# Patient Record
Sex: MALE | Race: WHITE
[De-identification: names, ages, dates, MRNs, and addresses within clinical notes are randomized per-mention and may not be internally consistent; named-entity substitution may affect disease eponyms.]

---

## 2018-02-15 ENCOUNTER — HOSPITAL ENCOUNTER (EMERGENCY)
Dept: HOSPITAL 62 - ER | Age: 33
Discharge: LEFT BEFORE DISCHARGE | End: 2018-02-15
Payer: SELF-PAY

## 2018-02-15 VITALS — DIASTOLIC BLOOD PRESSURE: 78 MMHG | SYSTOLIC BLOOD PRESSURE: 114 MMHG

## 2018-02-15 DIAGNOSIS — Z53.21: Primary | ICD-10-CM

## 2018-03-18 ENCOUNTER — HOSPITAL ENCOUNTER (EMERGENCY)
Dept: HOSPITAL 62 - ER | Age: 33
Discharge: HOME | End: 2018-03-18
Payer: SELF-PAY

## 2018-03-18 VITALS — DIASTOLIC BLOOD PRESSURE: 69 MMHG | SYSTOLIC BLOOD PRESSURE: 111 MMHG

## 2018-03-18 DIAGNOSIS — K02.9: ICD-10-CM

## 2018-03-18 DIAGNOSIS — K08.89: ICD-10-CM

## 2018-03-18 DIAGNOSIS — R22.0: ICD-10-CM

## 2018-03-18 DIAGNOSIS — F17.200: ICD-10-CM

## 2018-03-18 DIAGNOSIS — K04.7: Primary | ICD-10-CM

## 2018-03-18 PROCEDURE — 99282 EMERGENCY DEPT VISIT SF MDM: CPT

## 2018-03-18 NOTE — ER DOCUMENT REPORT
ED General





- General


Chief Complaint: Toothache


Stated Complaint: TOOTH PAIN


Time Seen by Provider: 03/18/18 03:19


Notes: 





Patient is a 32-year-old male without past medical history presents with 2 days 

of progressively worsening pain and swelling to the left lower jaw.  He 

describes it as severe, constant, throbbing pain.  Worsened by drinking and 

eating.  He has tried over-the-counter pain medications without any relief.  

States this feels similar to when he had dental infections in the past.  He has 

not seen a dentist or make contact with the dentist regarding today's concerns 

due to lack of availability to pay for these services.  He denies any fever or 

constitutional symptoms.  No headache or neck pain.  No difficulty breathing or 

swallowing.


TRAVEL OUTSIDE OF THE [place] IN LAST 30 DAYS: No





- Related Data


Allergies/Adverse Reactions: 


 





No Known Allergies Allergy (Verified 12/24/17 08:52)


 











Past Medical History





- General


Information source: Patient





- Social History


Smoking Status: Current Every Day Smoker


Frequency of alcohol use: Rare


Drug Abuse: None


Lives with: Family


Family History: Reviewed & Not Pertinent


Renal/ Medical History: Denies: Hx Peritoneal Dialysis





Review of Systems





- Review of Systems


Notes: 





Constitutional: Negative for fever.


HENT: Positive for dental pain and lower facial swelling


Eyes: Negative for visual changes.


Cardiovascular: Negative for chest pain.


Respiratory: Negative for shortness of breath.


Gastrointestinal: Negative for abdominal pain, vomiting or diarrhea.


Genitourinary: Negative for dysuria.


Musculoskeletal: Negative for back pain.


Skin: Negative for rash.


Neurological: Negative for headaches, weakness or numbness.





10 point ROS negative except as marked above and in HPI.





Physical Exam





- Vital signs


Interpretation: Normal


Notes: 





PHYSICAL EXAMINATION:





GENERAL: Well-appearing, well-nourished and in no acute distress.





HEAD: Atraumatic, normocephalic.





EYES: Pupils equal round and reactive to light, extraocular movements intact, 

sclera anicteric, conjunctiva are normal.





ENT: Mild swelling to the left lower mandible, poor dentition throughout, tooth 

#18 is cracked with obvious deep dental carry without any evidence of an apical 

abscess or fluctuance to the gumline





NECK: Normal range of motion, supple without lymphadenopathy





LUNGS: Breath sounds clear to auscultation bilaterally and equal.  No wheezes 

rales or rhonchi.





HEART: Regular rate and rhythm without murmurs





ABDOMEN: Soft, nontender, normoactive bowel sounds.  No guarding, no rebound.  

No masses appreciated.





EXTREMITIES: Normal range of motion, no pitting or edema.  No cyanosis.





NEUROLOGICAL: No focal neurological deficits. Moves all extremities 

spontaneously and on command.





PSYCH: Normal mood, normal affect.





SKIN: Warm, Dry, normal turgor, no rashes or lesions noted.





Course





- Re-evaluation


Re-evalutation: 





03/18/18 03:31


Presentation is most consistent with likely an infected tooth.  Patient does 

have moderate facial swelling however airway is patent.  Vitals within normal 

limits.  Patient is able swallow without any difficulty.   No evidence of 

Emiliano angina, apical abscess, or airway obstruction.  Patient will be started 

on antibiotics.  I've instructed to follow-up with dentistry as earliest 

ability for definitive management.  At this time will discharge with return 

precautions and follow-up recommendations.  Verbal discharge instructions given 

a the bedside and opportunity for questions given. Medication warnings 

reviewed. Patient is in agreement with this plan and has verbalized 

understanding of return precautions and the need for primary care follow-up in 

the next 24-72 hours.





Discharge





- Discharge


Clinical Impression: 


 Dental infection, Facial swelling, Pain, dental





Condition: Good


Disposition: HOME, SELF-CARE


Additional Instructions: 


You have been seen for dental pain.  You have an infected tooth that needs to 

be removed urgently.  Your being started on antibiotics to try to treat the 

infection in the interim but this is not definitive management.  It is very 

important that you follow-up with a dentist for definitive care.  Please return 

if you develop fever greater than 101, swelling in your face, vomiting, 

difficulty breathing or swallowing, or any other symptoms that are concerning 

to you.  For your pain: Take ibuprofen 600 mg and acetaminophen 1000 mg every 6 

hours together as needed for pain.  If this does not control your pain you may 

take 15 mg of oral morphine every 4 hours as needed.  Please be very careful 

about using the oral morphine and only use this for severe pain.


Prescriptions: 


Morphine Sulfate [Morphine Ir 15 mg Tablet] 15 mg PO Q4HP PRN #12 tablet


 PRN Reason: 


Amox Tr/Potassium Clavulanate [Augmentin 875-125 Tablet] 1 tab PO BID 10 Days  

tablet

## 2018-03-21 ENCOUNTER — HOSPITAL ENCOUNTER (EMERGENCY)
Dept: HOSPITAL 62 - ER | Age: 33
Discharge: LEFT BEFORE DISCHARGE | End: 2018-03-21
Payer: SELF-PAY

## 2018-03-21 VITALS — SYSTOLIC BLOOD PRESSURE: 122 MMHG | DIASTOLIC BLOOD PRESSURE: 70 MMHG

## 2018-03-21 DIAGNOSIS — K05.10: ICD-10-CM

## 2018-03-21 DIAGNOSIS — F17.200: ICD-10-CM

## 2018-03-21 DIAGNOSIS — K08.89: ICD-10-CM

## 2018-03-21 DIAGNOSIS — K02.9: Primary | ICD-10-CM

## 2018-03-21 PROCEDURE — 99282 EMERGENCY DEPT VISIT SF MDM: CPT

## 2018-03-21 NOTE — ER DOCUMENT REPORT
HPI





- HPI


Pain Level: 5


Notes: 





Patient is a 32-year-old male with no significant past medical history who 

presents to the ED complaining of continued dental pain to approximately #184-

5 days.  Patient states that he was evaluated 3 days ago and was given morphine 

sulfate as well as Augmentin.  Patient states that he has been taking his 

medications as directed, but has been taking most of his morphine and is down 

to 1 tablet because of pain.  Patient states that his overall swelling has 

resolved, but continues to have pain and is only here because he wants more and 

stronger narcotics.  Patient states that the morphine does not work as well as 

he thinks it should any needs a stronger narcotic.  Patient has not called to 

schedule an appoint with a dentist at this time.  He has not noticed any 

obvious abscess or purulent discharge.  He is eating and drinking without any 

difficulties.  He is urinating normally and having normal bowel movements.  

Patient states that he has not been taking any Tylenol or Motrin because that 

does not work.  denies any drug allergies.  Patient admits to smoking but 

denies IV drug use.  Denies any headache, fever, head injury, neck pain, URI, 

sore throat, chest pain, palpitations, syncope, cough, shortness of breath, 

wheeze, dyspnea, abdominal pain, nausea/vomiting/diarrhea, urinary retention, 

dysuria, hematuria, or rash.





- ROS


Systems Reviewed and Negative: Yes All other systems reviewed and negative





Past Medical History





- Social History


Smoking Status: Current Every Day Smoker


Family History: Reviewed & Not Pertinent


Renal/ Medical History: Denies: Hx Peritoneal Dialysis





Vertical Provider Document





- CONSTITUTIONAL


Agree With Documented VS: Yes


Notes: 





PHYSICAL EXAMINATION:





GENERAL: Well-appearing, well-nourished and in no acute distress.





HEAD: Atraumatic, normocephalic.





EYES: Pupils equal round and reactive to light, extraocular movements intact, 

sclera anicteric, conjunctiva are normal.





ENT: EAC clear b/l.  TM's intact b/l without erythema, fluid, or perforation.  

Nares patent and without discharge.  oropharynx clear without exudates.  No 

tonsilar hypertrophy or erythema.  Moist mucous membranes.  No sinus 

tenderness.  Uvula midline.  No palatine shift.  No tongue protrusion.  No 

respiratory compromise.





Mouth: Pt has upper dental plate and partial lower plate.  Poor dentition.  + 

moderate/severe decay and mild gingivitis.  No obvious abscess or discharge 

noted.  No facial swelling.  + tenderness to tooth #18.





NECK: Normal range of motion, supple without lymphadenopathy.  No rigidity/

meningismus.





LUNGS: Breath sounds clear to auscultation bilaterally and equal.  No wheezes 

rales or rhonchi.





HEART: Regular rate and rhythm without murmurs, rubs, gallops.





NEUROLOGICAL: Cranial nerves grossly intact.  Normal speech, normal gait.  

Normal sensory, motor exams 





PSYCH: Normal mood, normal affect.





SKIN: Warm, Dry, normal turgor, no rashes or lesions noted.





- INFECTION CONTROL


TRAVEL OUTSIDE OF THE [place] IN LAST 30 DAYS: No





Course





- Re-evaluation


Re-evalutation: 





03/21/18 17:15


Patient is an afebrile, well-hydrated, 32-year-old male who presents to the ED 

with continued dental pain requesting narcotic medication as he is about to run 

out of his morphine that he was given 3 days ago.  Vitals are acceptable.  PE 

is otherwise unremarkable.  Patient states that the only reason he is here is 

for more pain medications.  Patient has not called a dentist to schedule an 

appointment.  Patient has no swelling or evidence of abscess on exam today.  

Patient states that he has not been taking any Tylenol or Motrin because that 

does not help take away pain.  Patient is started on Augmentin.  Toradol 

ordered.  I will send him with viscous lidocaine as well.  Declined dental 

block.  Advised patient that I will not be sending him home with any narcotic 

medication at this time and that he needs to be seen by dentist for probable 

extraction of that tooth and no I&D warranted.  Advised that he take Tylenol 

and Motrin as well as other conservative measures.  Low suspicion for any 

meningitis, sepsis, peritonsillar/pharyngeal abscess, respiratory compromise, 

Emiliano's, temporal arteritis, or other emergent systemic condition at this 

time.  Patient is aware this condition can change from initial presentation and 

he needs to monitor symptoms closely.  Conservative measures otherwise for 

symptoms.  Call to schedule an appointment with a dentist for further 

evaluation and management.  Return to the ED with any worsening/concerning 

symptoms otherwise as reviewed in discharge.  Patient is in agreement.








I reviewed all of this with the patient prior to him walking out.


Upon leaving the room and the nurse pulling the medications, patient left 

through the back door (pt was already up for discharge w. orders).











- Vital Signs


Vital signs: 


 











Temp Pulse Resp BP Pulse Ox


 


 98.2 F   64   16   122/70   100 


 


 03/21/18 16:44  03/21/18 16:44  03/21/18 16:44  03/21/18 16:44  03/21/18 16:44














Discharge





- Discharge


Clinical Impression: 


 Pain, dental





Condition: Stable


Disposition: HOME, SELF-CARE


Instructions:  Dentist, Toothache ([facility])


Additional Instructions: 


Brush and floss twice daily


Maintain fluid intake


Take antibiotics as directed


Mouthwash, salt water gargles, peroxide rinse as needed


Tylenol/ibuprofen as needed


Recheck with PCM this week


Call today/tomorrow and schedule an appointment with your dentist for further 

evaluation


Return to the ED with any worsening symptoms and/or development of fever, 

headache, facial swelling, swelling of lips/tongue/throat, trouble swallowing, 

drooling, hoarseness, neck pain/stiffness, chest pain, palpitations, syncope, 

shortness of breath, trouble breathing, abdominal pain, n/v/d, numbness/tingling

, or other worsening symptoms that are concerning to you.


Referrals: 


[facility] [Provider Group] - Follow up as needed

## 2018-04-06 ENCOUNTER — HOSPITAL ENCOUNTER (EMERGENCY)
Dept: HOSPITAL 62 - ER | Age: 33
LOS: 1 days | Discharge: HOME | End: 2018-04-07
Payer: SELF-PAY

## 2018-04-06 DIAGNOSIS — F17.200: ICD-10-CM

## 2018-04-06 DIAGNOSIS — K04.7: Primary | ICD-10-CM

## 2018-04-06 LAB
ADD MANUAL DIFF: NO
ANION GAP SERPL CALC-SCNC: 7 MMOL/L (ref 5–19)
BASOPHILS # BLD AUTO: 0.1 10^3/UL (ref 0–0.2)
BASOPHILS NFR BLD AUTO: 0.4 % (ref 0–2)
BUN SERPL-MCNC: 12 MG/DL (ref 7–20)
CALCIUM: 9.8 MG/DL (ref 8.4–10.2)
CHLORIDE SERPL-SCNC: 102 MMOL/L (ref 98–107)
CO2 SERPL-SCNC: 31 MMOL/L (ref 22–30)
EOSINOPHIL # BLD AUTO: 0.2 10^3/UL (ref 0–0.6)
EOSINOPHIL NFR BLD AUTO: 1.6 % (ref 0–6)
ERYTHROCYTE [DISTWIDTH] IN BLOOD BY AUTOMATED COUNT: 13.3 % (ref 11.5–14)
GLUCOSE SERPL-MCNC: 80 MG/DL (ref 75–110)
HCT VFR BLD CALC: 40.5 % (ref 37.9–51)
HGB BLD-MCNC: 13.6 G/DL (ref 13.5–17)
LYMPHOCYTES # BLD AUTO: 2.2 10^3/UL (ref 0.5–4.7)
LYMPHOCYTES NFR BLD AUTO: 14.6 % (ref 13–45)
MCH RBC QN AUTO: 31.3 PG (ref 27–33.4)
MCHC RBC AUTO-ENTMCNC: 33.6 G/DL (ref 32–36)
MCV RBC AUTO: 93 FL (ref 80–97)
MONOCYTES # BLD AUTO: 1.7 10^3/UL (ref 0.1–1.4)
MONOCYTES NFR BLD AUTO: 11.6 % (ref 3–13)
NEUTROPHILS # BLD AUTO: 10.7 10^3/UL (ref 1.7–8.2)
NEUTS SEG NFR BLD AUTO: 71.8 % (ref 42–78)
PLATELET # BLD: 239 10^3/UL (ref 150–450)
POTASSIUM SERPL-SCNC: 3.8 MMOL/L (ref 3.6–5)
RBC # BLD AUTO: 4.36 10^6/UL (ref 4.35–5.55)
SODIUM SERPL-SCNC: 140 MMOL/L (ref 137–145)
TOTAL CELLS COUNTED % (AUTO): 100 %
WBC # BLD AUTO: 14.9 10^3/UL (ref 4–10.5)

## 2018-04-06 PROCEDURE — 36415 COLL VENOUS BLD VENIPUNCTURE: CPT

## 2018-04-06 PROCEDURE — 70487 CT MAXILLOFACIAL W/DYE: CPT

## 2018-04-06 PROCEDURE — 85025 COMPLETE CBC W/AUTO DIFF WBC: CPT

## 2018-04-06 PROCEDURE — 96365 THER/PROPH/DIAG IV INF INIT: CPT

## 2018-04-06 PROCEDURE — 80048 BASIC METABOLIC PNL TOTAL CA: CPT

## 2018-04-06 PROCEDURE — 87040 BLOOD CULTURE FOR BACTERIA: CPT

## 2018-04-06 PROCEDURE — 99284 EMERGENCY DEPT VISIT MOD MDM: CPT

## 2018-04-06 PROCEDURE — 96375 TX/PRO/DX INJ NEW DRUG ADDON: CPT

## 2018-04-06 NOTE — ER DOCUMENT REPORT
ED General





- General


Chief Complaint: Abscess


Stated Complaint: ABSCESS


Time Seen by Provider: 04/06/18 22:13


TRAVEL OUTSIDE OF THE [place] IN LAST 30 DAYS: No





- HPI


Patient complains to provider of: Left jaw swelling


Notes: 





Patient coming in for left jaw swelling.  Patient states swelling occurred 

approximately last 24 hours.  Patient states he has an abscess and did try to 

drain it himself swelling occurred after he self treated.  Patient has been to 

the ER now multiple times in the past few months approximately 4 for dental 

issues.  Patient states he does have a current dental appointment however did 

not think he can make it.  Patient  denies any shortness of breath difficulty 

swallowing fevers chills nausea vomiting.  Patient resting comfortably upon my 

evaluation with obvious jaw swelling on the left side.





- Related Data


Allergies/Adverse Reactions: 


 





No Known Allergies Allergy (Verified 12/24/17 08:52)


 











Past Medical History





- Social History


Smoking Status: Current Every Day Smoker


Chew tobacco use (# tins/day): No


Frequency of alcohol use: None


Drug Abuse: None


Family History: Reviewed & Not Pertinent


Patient has suicidal ideation: No


Patient has homicidal ideation: No


Renal/ Medical History: Denies: Hx Peritoneal Dialysis





Review of Systems





- Review of Systems


Constitutional: No symptoms reported


EENT: Other - Dental pain and jaw swelling


Cardiovascular: No symptoms reported


Respiratory: No symptoms reported


Gastrointestinal: No symptoms reported


Genitourinary: No symptoms reported


Male Genitourinary: No symptoms reported


Musculoskeletal: No symptoms reported


Skin: No symptoms reported


Hematologic/Lymphatic: No symptoms reported


Neurological/Psychological: No symptoms reported


-: Yes All other systems reviewed and negative





Physical Exam





- Vital signs


Vitals: 


 











Temp Pulse Resp BP Pulse Ox


 


 98.8 F   85   18   122/73   97 


 


 04/06/18 21:40  04/06/18 21:40  04/06/18 21:40  04/06/18 21:40  04/06/18 21:40











Interpretation: Normal





- General


General appearance: Appears well, Alert





- HEENT


Head: Normocephalic, Atraumatic


Eyes: Normal


Conjunctiva: Normal


Cornea: Normal


Pupils: PERRL


Neck: Normal


Notes: 





Examination of the left sagittal reveals obvious swelling approximate size of a 

golf ball.  There is no swelling underneath the mental region no submandibular 

swelling.  Examination of the oral cavity reveals horrible augmentation patient 

has an obvious cavity in tooth number approximately 19 or 20 is difficult to 

tell the exact numbers that he is missing numerous teeth.  Patient does have a 

small area looks like to be an abscess formation at this site.





- Respiratory


Respiratory status: No respiratory distress


Chest status: Nontender


Breath sounds: Normal


Chest palpation: Normal





- Cardiovascular


Rhythm: Regular


Heart sounds: Normal auscultation


Murmur: No





- Abdominal


Inspection: Normal


Distension: No distension


Bowel sounds: Normal


Tenderness: Nontender


Organomegaly: No organomegaly





- Back


Back: Normal, Nontender





- Extremities


General upper extremity: Normal inspection, Nontender, Normal color, Normal ROM

, Normal temperature


General lower extremity: Normal inspection, Nontender, Normal color, Normal ROM

, Normal temperature, Normal weight bearing.  No: Sonal's sign





- Neurological


Neuro grossly intact: Yes


Cognition: Normal


Orientation: AAOx4


[place] Coma Scale Eye Opening: Spontaneous


[place] Coma Scale Verbal: Oriented


[place] Coma Scale Motor: Obeys Commands


[place] Coma Scale Total: 15


Speech: Normal


Motor strength normal: LUE, RUE, LLE, RLE


Sensory: Normal





- Psychological


Associated symptoms: Normal affect, Normal mood





- Skin


Skin Temperature: Warm


Skin Moisture: Dry


Skin Color: Normal





Course





- Re-evaluation


Re-evalutation: 





04/07/18 01:43


CT scan does show overt abscess.  Examination today reveals small area possible 

abscess next to the concerning to dental block was performed did take an 18-

gauge exploratory however no pus was returned.  At this time no erythema no 

signs of liquids patient maintaining his airway will swallow.


Patient is very important he follows up with his dentist as scheduled and have 

the tooth removed.  Patient is demonstrating





- Vital Signs


Vital signs: 


 











Temp Pulse Resp BP Pulse Ox


 


 98.8 F   85   18   122/73   97 


 


 04/06/18 21:40  04/06/18 21:40  04/06/18 21:40  04/06/18 21:40  04/06/18 21:40














- Laboratory


Result Diagrams: 


 04/06/18 22:35





 04/06/18 22:35


Laboratory results interpreted by me: 


 











  04/06/18 04/06/18





  22:35 22:35


 


WBC  14.9 H 


 


Absolute Neutrophils  10.7 H 


 


Absolute Monocytes  1.7 H 


 


Carbon Dioxide   31 H














Discharge





- Discharge


Clinical Impression: 


 Dental infection





Condition: Good


Instructions:  Clindamycin ([facility]), Dentist, Dental Infection or Abscess ([facility]), 

Oral Narcotic Medication ([facility])


Additional Instructions: 


Your CAT scan does not show any signs of abscess formation.  Swelling to her 

jaw is more likely due to a dental infection.  It is very important to follow-

up with your dentist as scheduled take antibiotics as prescribed.  Return to ER 

if symptoms worsen.


Prescriptions: 


Clindamycin HCl [Cleocin 150 mg Capsule] 150 mg PO Q6 #40 capsule


Hydrocodone/Acetaminophen [Hydrocodon-Acetaminophen 5-325] 1 each PO Q6 PRN #21 

tablet


 PRN Reason:

## 2018-04-06 NOTE — RADIOLOGY REPORT ([facility])
EXAM DESCRIPTION:  CT FACIAL AREA WITH



COMPLETED DATE/TIME:  4/6/2018 11:22 pm



REASON FOR STUDY:  left jaw swelling possible abscess



COMPARISON:  None.



TECHNIQUE:  Post contrast images through the facial bones and orbits windowed for bone and soft tissu
e.  Additional coronal and sagittal reconstructed images reviewed.  All images stored on PACS.

All CT scanners at this facility use dose modulation, iterative reconstruction, and/or weight based d
osing when appropriate to reduce radiation dose to as low as reasonably achievable (ALARA).

[facility]: Dose Right  [facility]: CareDose    [facility]: Dose Right    [facility]: Teradose 4D    [facility]: Smart Technologies



CONTRAST TYPE AND DOSE:  contrast/concentration: Isovue 370.00 mg/ml; Total Contrast Delivered: 75.0 
ml; Total Saline Delivered: 55.0 ml



RENAL FUNCTION:  None required. The patient is less than 50 years old.



RADIATION DOSE:  CT Rad equipment meets quality standard of care and radiation dose reduction techniq
ues were employed. CTDIvol: 30.4 mGy. DLP: 574 mGy-cm. .



LIMITATIONS:  None.



FINDINGS:  FACIAL BONES: No fracture or bone lesion.

ORBITS: Intact.  No fracture.  Symmetric intact globes and retroorbital soft tissues.

PARANASAL SINUSES: Scattered ethmoid an maxillary -frontal sinus mucosal thickening.  No air-fluid le
vels.  .

SOFT TISSUES: Diffuse swelling in the left mandibular region with reactive adenopathy.  No fluid juliet
ection or abscess identified.

INFERIOR BRAIN: Limited view.  No acute findings.

OTHER: No other significant finding.



IMPRESSION:  Diffuse swelling in the left mandibular region with reactive adenopathy.  No fluid colle
ction or abscess identified.



TECHNICAL DOCUMENTATION:  JOB ID:  [id number]

[id number]

Quality ID # [id number]: Final reports with documentation of one or more dose reduction techniques (e.g., Au
tomated exposure control, adjustment of the mA and/or kV according to patient size, use of iterative 
reconstruction technique)

 2011 [employer]- All Rights Reserved



Reading location - IP/workstation name: [email or web address]

## 2018-04-07 VITALS — DIASTOLIC BLOOD PRESSURE: 69 MMHG | SYSTOLIC BLOOD PRESSURE: 120 MMHG
